# Patient Record
Sex: MALE | Race: BLACK OR AFRICAN AMERICAN | NOT HISPANIC OR LATINO | URBAN - METROPOLITAN AREA
[De-identification: names, ages, dates, MRNs, and addresses within clinical notes are randomized per-mention and may not be internally consistent; named-entity substitution may affect disease eponyms.]

---

## 2021-06-11 ENCOUNTER — RECORDS - HEALTHEAST (OUTPATIENT)
Dept: SCHEDULING | Facility: CLINIC | Age: 21
End: 2021-06-11

## 2021-06-25 NOTE — TELEPHONE ENCOUNTER
New Appointment Needed  What is the reason for the visit:    Pre-Op Appt Request  When is the surgery? :  6/15  Where is the surgery?:  [facility]  Who is the surgeon? :  ??  What type of surgery is being done?: FINGER  Provider Preference: Any available  How soon do you need to be seen?: ASAP  Waitlist offered?: No  Okay to leave a detailed message:  Yes    NEW PATIENT -- PRE OP FINGER 6/15 AT [facility]